# Patient Record
Sex: FEMALE | Race: OTHER | HISPANIC OR LATINO | URBAN - METROPOLITAN AREA
[De-identification: names, ages, dates, MRNs, and addresses within clinical notes are randomized per-mention and may not be internally consistent; named-entity substitution may affect disease eponyms.]

---

## 2018-02-07 ENCOUNTER — INPATIENT (INPATIENT)
Facility: HOSPITAL | Age: 23
LOS: 0 days | Discharge: HOME | End: 2018-02-08
Attending: OBSTETRICS & GYNECOLOGY | Admitting: OBSTETRICS & GYNECOLOGY
Payer: MEDICAID

## 2018-02-07 ENCOUNTER — RESULT REVIEW (OUTPATIENT)
Age: 23
End: 2018-02-07

## 2018-02-07 VITALS
RESPIRATION RATE: 17 BRPM | HEART RATE: 118 BPM | DIASTOLIC BLOOD PRESSURE: 86 MMHG | SYSTOLIC BLOOD PRESSURE: 118 MMHG | TEMPERATURE: 99 F | OXYGEN SATURATION: 100 %

## 2018-02-07 NOTE — ED ADULT TRIAGE NOTE - CHIEF COMPLAINT QUOTE
lmp 1/17, went to the clinic last week and was told via urine test that shes pregnant. no u/s preformed. pt c/o severe L pelvic pain sudden onset x 1 hr. pt writhing in pain in triage.

## 2018-02-08 ENCOUNTER — TRANSCRIPTION ENCOUNTER (OUTPATIENT)
Age: 23
End: 2018-02-08

## 2018-02-08 VITALS
DIASTOLIC BLOOD PRESSURE: 77 MMHG | RESPIRATION RATE: 15 BRPM | HEART RATE: 76 BPM | OXYGEN SATURATION: 99 % | SYSTOLIC BLOOD PRESSURE: 123 MMHG

## 2018-02-08 DIAGNOSIS — O00.90 UNSPECIFIED ECTOPIC PREGNANCY WITHOUT INTRAUTERINE PREGNANCY: ICD-10-CM

## 2018-02-08 PROBLEM — Z00.00 ENCOUNTER FOR PREVENTIVE HEALTH EXAMINATION: Status: ACTIVE | Noted: 2018-02-08

## 2018-02-08 LAB
ALBUMIN SERPL ELPH-MCNC: 4.4 G/DL — SIGNIFICANT CHANGE UP (ref 3.3–5)
ALP SERPL-CCNC: 90 U/L — SIGNIFICANT CHANGE UP (ref 40–120)
ALT FLD-CCNC: 13 U/L — SIGNIFICANT CHANGE UP (ref 4–33)
APPEARANCE UR: CLEAR — SIGNIFICANT CHANGE UP
AST SERPL-CCNC: 28 U/L — SIGNIFICANT CHANGE UP (ref 4–32)
BASOPHILS # BLD AUTO: 0.04 K/UL — SIGNIFICANT CHANGE UP (ref 0–0.2)
BASOPHILS NFR BLD AUTO: 0.4 % — SIGNIFICANT CHANGE UP (ref 0–2)
BILIRUB SERPL-MCNC: 0.3 MG/DL — SIGNIFICANT CHANGE UP (ref 0.2–1.2)
BILIRUB UR-MCNC: NEGATIVE — SIGNIFICANT CHANGE UP
BLD GP AB SCN SERPL QL: NEGATIVE — SIGNIFICANT CHANGE UP
BLOOD UR QL VISUAL: HIGH
BUN SERPL-MCNC: 10 MG/DL — SIGNIFICANT CHANGE UP (ref 7–23)
CALCIUM SERPL-MCNC: 9.2 MG/DL — SIGNIFICANT CHANGE UP (ref 8.4–10.5)
CHLORIDE SERPL-SCNC: 103 MMOL/L — SIGNIFICANT CHANGE UP (ref 98–107)
CO2 SERPL-SCNC: 23 MMOL/L — SIGNIFICANT CHANGE UP (ref 22–31)
COLOR SPEC: YELLOW — SIGNIFICANT CHANGE UP
CREAT SERPL-MCNC: 0.68 MG/DL — SIGNIFICANT CHANGE UP (ref 0.5–1.3)
EOSINOPHIL # BLD AUTO: 0.14 K/UL — SIGNIFICANT CHANGE UP (ref 0–0.5)
EOSINOPHIL NFR BLD AUTO: 1.3 % — SIGNIFICANT CHANGE UP (ref 0–6)
GLUCOSE SERPL-MCNC: 150 MG/DL — HIGH (ref 70–99)
GLUCOSE UR-MCNC: NEGATIVE — SIGNIFICANT CHANGE UP
HCG SERPL-ACNC: 1431 MIU/ML — SIGNIFICANT CHANGE UP
HCT VFR BLD CALC: 35.9 % — SIGNIFICANT CHANGE UP (ref 34.5–45)
HGB BLD-MCNC: 11.8 G/DL — SIGNIFICANT CHANGE UP (ref 11.5–15.5)
HYALINE CASTS # UR AUTO: SIGNIFICANT CHANGE UP (ref 0–?)
IMM GRANULOCYTES # BLD AUTO: 0.02 # — SIGNIFICANT CHANGE UP
IMM GRANULOCYTES NFR BLD AUTO: 0.2 % — SIGNIFICANT CHANGE UP (ref 0–1.5)
KETONES UR-MCNC: SIGNIFICANT CHANGE UP
LEUKOCYTE ESTERASE UR-ACNC: SIGNIFICANT CHANGE UP
LYMPHOCYTES # BLD AUTO: 1.73 K/UL — SIGNIFICANT CHANGE UP (ref 1–3.3)
LYMPHOCYTES # BLD AUTO: 16.1 % — SIGNIFICANT CHANGE UP (ref 13–44)
MCHC RBC-ENTMCNC: 26.9 PG — LOW (ref 27–34)
MCHC RBC-ENTMCNC: 32.9 % — SIGNIFICANT CHANGE UP (ref 32–36)
MCV RBC AUTO: 81.8 FL — SIGNIFICANT CHANGE UP (ref 80–100)
MONOCYTES # BLD AUTO: 0.58 K/UL — SIGNIFICANT CHANGE UP (ref 0–0.9)
MONOCYTES NFR BLD AUTO: 5.4 % — SIGNIFICANT CHANGE UP (ref 2–14)
MUCOUS THREADS # UR AUTO: SIGNIFICANT CHANGE UP
NEUTROPHILS # BLD AUTO: 8.26 K/UL — HIGH (ref 1.8–7.4)
NEUTROPHILS NFR BLD AUTO: 76.6 % — SIGNIFICANT CHANGE UP (ref 43–77)
NITRITE UR-MCNC: NEGATIVE — SIGNIFICANT CHANGE UP
NRBC # FLD: 0 — SIGNIFICANT CHANGE UP
PH UR: 7 — SIGNIFICANT CHANGE UP (ref 4.6–8)
PLATELET # BLD AUTO: 270 K/UL — SIGNIFICANT CHANGE UP (ref 150–400)
PMV BLD: 10.5 FL — SIGNIFICANT CHANGE UP (ref 7–13)
POTASSIUM SERPL-MCNC: 4 MMOL/L — SIGNIFICANT CHANGE UP (ref 3.5–5.3)
POTASSIUM SERPL-SCNC: 4 MMOL/L — SIGNIFICANT CHANGE UP (ref 3.5–5.3)
PROT SERPL-MCNC: 7.2 G/DL — SIGNIFICANT CHANGE UP (ref 6–8.3)
PROT UR-MCNC: 30 MG/DL — HIGH
RBC # BLD: 4.39 M/UL — SIGNIFICANT CHANGE UP (ref 3.8–5.2)
RBC # FLD: 14 % — SIGNIFICANT CHANGE UP (ref 10.3–14.5)
RBC CASTS # UR COMP ASSIST: >50 — HIGH (ref 0–?)
RH IG SCN BLD-IMP: POSITIVE — SIGNIFICANT CHANGE UP
RH IG SCN BLD-IMP: POSITIVE — SIGNIFICANT CHANGE UP
SODIUM SERPL-SCNC: 144 MMOL/L — SIGNIFICANT CHANGE UP (ref 135–145)
SP GR SPEC: 1.03 — SIGNIFICANT CHANGE UP (ref 1–1.04)
SQUAMOUS # UR AUTO: SIGNIFICANT CHANGE UP
UROBILINOGEN FLD QL: 2 MG/DL — HIGH
WBC # BLD: 10.77 K/UL — HIGH (ref 3.8–10.5)
WBC # FLD AUTO: 10.77 K/UL — HIGH (ref 3.8–10.5)
WBC UR QL: HIGH (ref 0–?)

## 2018-02-08 PROCEDURE — 76817 TRANSVAGINAL US OBSTETRIC: CPT

## 2018-02-08 PROCEDURE — 88305 TISSUE EXAM BY PATHOLOGIST: CPT | Mod: 26

## 2018-02-08 PROCEDURE — 76830 TRANSVAGINAL US NON-OB: CPT | Mod: 26

## 2018-02-08 RX ORDER — SODIUM CHLORIDE 9 MG/ML
1000 INJECTION INTRAMUSCULAR; INTRAVENOUS; SUBCUTANEOUS ONCE
Qty: 0 | Refills: 0 | Status: COMPLETED | OUTPATIENT
Start: 2018-02-08 | End: 2018-02-08

## 2018-02-08 RX ORDER — MORPHINE SULFATE 50 MG/1
4 CAPSULE, EXTENDED RELEASE ORAL ONCE
Qty: 0 | Refills: 0 | Status: DISCONTINUED | OUTPATIENT
Start: 2018-02-08 | End: 2018-02-08

## 2018-02-08 RX ORDER — ACETAMINOPHEN 500 MG
650 TABLET ORAL EVERY 6 HOURS
Qty: 0 | Refills: 0 | Status: DISCONTINUED | OUTPATIENT
Start: 2018-02-08 | End: 2018-02-08

## 2018-02-08 RX ORDER — OXYCODONE HYDROCHLORIDE 5 MG/1
1 TABLET ORAL
Qty: 10 | Refills: 0 | OUTPATIENT
Start: 2018-02-08

## 2018-02-08 RX ORDER — FENTANYL CITRATE 50 UG/ML
50 INJECTION INTRAVENOUS
Qty: 0 | Refills: 0 | Status: DISCONTINUED | OUTPATIENT
Start: 2018-02-08 | End: 2018-02-08

## 2018-02-08 RX ORDER — OXYCODONE HYDROCHLORIDE 5 MG/1
5 TABLET ORAL EVERY 4 HOURS
Qty: 0 | Refills: 0 | Status: DISCONTINUED | OUTPATIENT
Start: 2018-02-08 | End: 2018-02-08

## 2018-02-08 RX ORDER — ACETAMINOPHEN 500 MG
2 TABLET ORAL
Qty: 0 | Refills: 0 | COMMUNITY
Start: 2018-02-08

## 2018-02-08 RX ORDER — ONDANSETRON 8 MG/1
4 TABLET, FILM COATED ORAL ONCE
Qty: 0 | Refills: 0 | Status: DISCONTINUED | OUTPATIENT
Start: 2018-02-08 | End: 2018-02-08

## 2018-02-08 RX ORDER — FENTANYL CITRATE 50 UG/ML
25 INJECTION INTRAVENOUS
Qty: 0 | Refills: 0 | Status: DISCONTINUED | OUTPATIENT
Start: 2018-02-08 | End: 2018-02-08

## 2018-02-08 RX ORDER — SODIUM CHLORIDE 9 MG/ML
1000 INJECTION, SOLUTION INTRAVENOUS
Qty: 0 | Refills: 0 | Status: DISCONTINUED | OUTPATIENT
Start: 2018-02-08 | End: 2018-02-08

## 2018-02-08 RX ADMIN — SODIUM CHLORIDE 2000 MILLILITER(S): 9 INJECTION INTRAMUSCULAR; INTRAVENOUS; SUBCUTANEOUS at 00:32

## 2018-02-08 RX ADMIN — MORPHINE SULFATE 4 MILLIGRAM(S): 50 CAPSULE, EXTENDED RELEASE ORAL at 00:24

## 2018-02-08 RX ADMIN — MORPHINE SULFATE 4 MILLIGRAM(S): 50 CAPSULE, EXTENDED RELEASE ORAL at 02:37

## 2018-02-08 RX ADMIN — SODIUM CHLORIDE 125 MILLILITER(S): 9 INJECTION, SOLUTION INTRAVENOUS at 05:25

## 2018-02-08 NOTE — BRIEF OPERATIVE NOTE - OPERATION/FINDINGS
grossly dilated left fallopian tube with clots at fimbriated end, c/w ruptured ectopic pregnancy. 50cc hemoperitoneum evacuated  Grossly normal uterus, right fallopian tube, and bilateral ovaries

## 2018-02-08 NOTE — ASU DISCHARGE PLAN (ADULT/PEDIATRIC). - ACTIVITY LEVEL
weight bearing as tolerated/no douching/no intercourse/for 2 wks/nothing per vagina/no tub baths/no tampons

## 2018-02-08 NOTE — ASU DISCHARGE PLAN (ADULT/PEDIATRIC). - SPECIAL INSTRUCTIONS
Please call [phone] to schedule postoperative appointment at GYN clinic within 2 weeks  Avoid inserting anything into vagina for 2 weeks (tampons, sex)  You may shower normally, avoid tub baths  Light vaginal bleeding is normal. Seek medical evaluation if you have heavy bleeding soaking 1 pad per hour

## 2018-02-08 NOTE — BRIEF OPERATIVE NOTE - PROCEDURE
<<-----Click on this checkbox to enter Procedure Laparoscopic salpingectomy, for ectopic pregnancy  02/08/2018    Active  [id number]

## 2018-02-08 NOTE — ASU DISCHARGE PLAN (ADULT/PEDIATRIC). - NOTIFY
GYN Fever>100.4/Excessive Diarrhea/Increased Irritability or Sluggishness/Pain not relieved by Medications/Inability to Tolerate Liquids or Foods/Numbness, color, or temperature change to extremity/Bleeding that does not stop

## 2018-02-08 NOTE — ED PROVIDER NOTE - PHYSICAL EXAMINATION
Gen: AOx3, writhing in pain on stretcher, holding her left lower abdomen.    Head: NCAT  HEENT: PERRL, oral mucosa moist, normal conjunctiva  Lung: CTAB, no respiratory distress  CV: rrr, no murmurs, Normal perfusion  Abd: soft, nondistended, TTP LLQ, no CVA tenderness  MSK: No edema, no visible deformities  Skin: No rash   - Kelsey Bass, DO

## 2018-02-08 NOTE — ED PROVIDER NOTE - ATTENDING CONTRIBUTION TO CARE
22F PMh asthma p/w abd pain.  LMP 12/15 then  ( 4mos ago), had well check up in Dec and wasn't pregnant, then well check up 1w ago w/ +preg. Today had sudden onset LLQ pain, minimal vaginal bleeding. Tachycardic, other vitals wnl. Exam as above.  ddx: ectopic vs. torsion vs. less likely other abd pathology  CBC, cmp, T and S, hcg. UA/cx. TVUS. IVF, symptom control, reassess.

## 2018-02-08 NOTE — CONSULT NOTE ADULT - SUBJECTIVE AND OBJECTIVE BOX
R2 GYN Consult Note    22y G_P_ LMP      presents with       OBHx:  GynHx:   PMH:  PSH:  All:  Meds:   SocialHx:     Vital Signs Last 24 Hrs  T(C): 36.7 (08 Feb 2018 00:24), Max: 37.1 (07 Feb 2018 23:09)  T(F): 98 (08 Feb 2018 00:24), Max: 98.7 (07 Feb 2018 23:09)  HR: 87 (08 Feb 2018 00:24) (87 - 118)  BP: 144/86 (08 Feb 2018 00:24) (118/86 - 144/86)  RR: 16 (08 Feb 2018 00:24) (16 - 17)  SpO2: 100% (08 Feb 2018 00:24) (100% - 100%)    PE:  Gen: Comfortable, NAD  CV: RRR  Pulm: CTAB  Abd: Soft, NT  Ext: No edema or tenderness bilaterally  Spec Exam:    LABS:                        11.8   10.77 )-----------( 270      ( 08 Feb 2018 00:10 )             35.9                 RADIOLOGY & ADDITIONAL STUDIES:

## 2018-02-08 NOTE — H&P ADULT - ASSESSMENT
23yo  LMP 18 with abdominal pain with bHCG 1431 found to have likely ruptured ectopic pregnancy    Patient is hemodynamically stable 21yo  LMP 18 with abdominal pain with bHCG 1431 found to have ruptured ectopic pregnancy    Patient is hemodynamically stable without clinical signs of anemia at this time. In setting of abdominal pain and imaging findings c/w ruptured ectopic pregnancy with hemoperitoneum, recommend surgical management. Reviewed indications with patient and mother. Discussed risks, benefits of surgery including but not limited to infection, bleeding, injury to surrounding organs. Patient verbalized understanding and signed informed consent for diagnostic laparoscopy, left salpingectomy, removal of ectopic pregnancy, possible ex-lap

## 2018-02-08 NOTE — ED PROVIDER NOTE - PROGRESS NOTE DETAILS
Klepfish: Pain and HR improved. updated pt regarding US results. OB at bedside, will reassess. Pasqualefish: d/w OB, pt to go to OR.

## 2018-02-08 NOTE — ED PROVIDER NOTE - OBJECTIVE STATEMENT
22F pmh asthma p/w severe left pelvic pain and vaginal bleeding since about 1 hour ago.  Pt had some crampy pains intermittently over past few days, but pain became severe just recently.  Pt LMP 18.  Pt had routine physical last month and was told she was pregnant by urine pregnancy test at doctors.  No ultrasound has been performed yet.  Pt had healthy baby  4 months ago.  Denies chest pain, shortness of breath, nausea/vomiting/diarrhea, dizziness.  Pt took 2 midol and 2 extra strength tylenol at onset of severe pain.    - Kelsey Bass DO 22F pmh asthma p/w severe left pelvic pain and vaginal bleeding since about 1 hour ago.  Pt had some crampy pains intermittently over past few days, but pain became severe just recently.  Pt LMP 18.  Pt had routine physical last month and was told she was pregnant by urine pregnancy test at doctors.  No ultrasound has been performed yet.  Pt had healthy baby  4 months ago.  Denies chest pain, shortness of breath, nausea/vomiting/diarrhea, dizziness.  Pt took 2 midol and 2 extra strength tylenol at onset of severe pain.    - DO Pasquale Hoffmannfish: 22F PMh asthma p/w abd pain.  LMP 12/15 then  ( 4mos ago), had well check up in Dec and wasn't pregnant, then well check up 1w ago w/ +preg. Today had sudden onset LLQ pain, constant, radiating to back, not similar to prior. Also minimal vaginal bleeding that began while in ED. Denies f/c, SOB/CP, NVD, urinary complaints.

## 2018-02-08 NOTE — H&P ADULT - PROBLEM SELECTOR PLAN 1
- to OR for case as above  - OR team informed  - pt to remain NPO    Pt seen with Dr. Juice Celis, PGY2

## 2018-02-08 NOTE — ASU DISCHARGE PLAN (ADULT/PEDIATRIC). - MEDICATION SUMMARY - MEDICATIONS TO TAKE
I will START or STAY ON the medications listed below when I get home from the hospital:    acetaminophen 325 mg oral tablet  -- 2 tab(s) by mouth every 6 hours, As needed, Mild Pain (1 - 3)  -- Indication: For postop pain    oxyCODONE 5 mg oral tablet  -- 1 tab(s) by mouth every 4 hours, As needed, Moderate Pain (4 - 6) MDD:6  -- Indication: For postop pain

## 2018-02-08 NOTE — H&P ADULT - HISTORY OF PRESENT ILLNESS
HPI:    21yo  LMP 18 4 months postpartum s/p  presenting to ED with worsening LLQ pain and vaginal bleeding x 1 day. Pt reports intermittent cramps over the past few days that resolved with Midol; however, around 6 pm today, patient reports significant sharp crampy pain in LLQ 10/10 not relieved with Midol. Pt denies nausea, vomiting, fever, chills, lightheadedness or dizziness.  This is not a desired pregnancy. States that she was having intercourse once a month hoping to not get pregnant.  GYN in [place]  H/o Chlamydia treated 1 yr ago    OBHx: SVDx1, SAB x1  GynHx: reg menses, no known f/c.   PMH: mild intermittent asthma  PSH: s/p D&C x1  All: PCN - anaphylaxis  SH: nonsmoker, no alcohol or other drugs    Vital Signs Last 24 Hrs  T(C): 36.7 (2018 00:24), Max: 37.1 (2018 23:09)  T(F): 98 (2018 00:24), Max: 98.7 (2018 23:09)  HR: 87 (2018 00:24) (87 - 118)  BP: 144/86 (2018 00:24) (118/86 - 144/86)  RR: 16 (2018 00:24) (16 - 17)  SpO2: 100% (2018 00:24) (100% - 100%)    PE:  Gen: Comfortable, NAD  CV: RRR, no murmur  Pulm: CTAB  Abd: soft, mild TTP in LLQ, no rebound or guarding  Ext: No edema or tenderness bilaterally  Spec Exam: bloody mucus at cervix, no active bleeding  Bimanual: anteverted uterus nontender, no CMT, +R adnexal tenderness    LABS:                        11.8   10.77 )-----------( 270      ( 2018 00:10 )             35.9     02-    144  |  103  |  10  ----------------------------<  150<H>  4.0   |  23  |  0.68    Ca    9.2      2018 00:10    TPro  7.2  /  Alb  4.4  /  TBili  0.3  /  DBili  x   /  AST  28  /  ALT  13  /  AlkPhos  90  02-08      Urinalysis Basic - ( 2018 00:48 )    Color: YELLOW / Appearance: CLEAR / S.030 / pH: 7.0  Gluc: NEGATIVE / Ketone: TRACE  / Bili: NEGATIVE / Urobili: 2 mg/dL   Blood: LARGE / Protein: 30 mg/dL / Nitrite: NEGATIVE   Leuk Esterase: TRACE / RBC: >50 / WBC 5-10   Sq Epi: FEW / Non Sq Epi: x / Bacteria: x        RADIOLOGY & ADDITIONAL STUDIES:

## 2018-02-08 NOTE — ED ADULT NURSE NOTE - OBJECTIVE STATEMENT
Pt 4 months s/p vaginal delivery received to rm 15 aaox4 ambulatory c/o 10/10 abdominal pain worse on left side.  Pt reports recent visit to pmd dx as pregnant via UCG, No US performed.  Pt's mother at bedside reports finding Pt one hour ago on the bathroom floor in pain with vaginal bleeding.  Pt denies NVD feeling weak light headed NO numbness or tingling.  20 g IV access obtained at L.AC labs and meds as ordered.  WIll monitor.

## 2018-02-09 LAB
BACTERIA UR CULT: SIGNIFICANT CHANGE UP
SPECIMEN SOURCE: SIGNIFICANT CHANGE UP

## 2018-02-22 ENCOUNTER — APPOINTMENT (OUTPATIENT)
Dept: OBGYN | Facility: HOSPITAL | Age: 23
End: 2018-02-22

## 2023-09-12 ENCOUNTER — EMERGENCY (EMERGENCY)
Facility: HOSPITAL | Age: 28
LOS: 1 days | Discharge: HOME | End: 2023-09-12
Attending: STUDENT IN AN ORGANIZED HEALTH CARE EDUCATION/TRAINING PROGRAM | Admitting: STUDENT IN AN ORGANIZED HEALTH CARE EDUCATION/TRAINING PROGRAM
Payer: MEDICAID

## 2023-09-12 VITALS
HEART RATE: 71 BPM | SYSTOLIC BLOOD PRESSURE: 130 MMHG | RESPIRATION RATE: 18 BRPM | OXYGEN SATURATION: 100 % | TEMPERATURE: 98 F | DIASTOLIC BLOOD PRESSURE: 92 MMHG

## 2023-09-12 PROBLEM — J45.909 UNSPECIFIED ASTHMA, UNCOMPLICATED: Chronic | Status: ACTIVE | Noted: 2018-02-08

## 2023-09-12 PROCEDURE — 99283 EMERGENCY DEPT VISIT LOW MDM: CPT

## 2023-09-12 RX ORDER — IBUPROFEN 200 MG
1 TABLET ORAL
Qty: 10 | Refills: 0
Start: 2023-09-12 | End: 2023-09-16

## 2023-09-12 NOTE — ED PROVIDER NOTE - NSFOLLOWUPINSTRUCTIONS_ED_ALL_ED_FT
Dental Pain    Dental pain (toothache) may be caused by many things including tooth decay (cavities or caries), abscess or infection, or trauma. If you were prescribed an antibiotic medicine, finish all of it even if you start to feel better. Rinsing your mouth with salt water or applying ice to the painful area of your face may help with the pain. Follow up with a dentist is important in ensuring good oral health and preventing the worsening of dental disease.    SEEK IMMEDIATE MEDICAL CARE IF YOU HAVE ANY OF THE FOLLOWING SYMPTOMS: unable to open your mouth, trouble breathing or swallowing, fever, or swelling of the face, neck, or jaw.    [facility] D[facility]AL [facility] INFORMATION  [id number], [place] [address]  Phone: [phone]

## 2023-09-12 NOTE — ED PROVIDER NOTE - OBJECTIVE STATEMENT
27F, denies pmh, who presents with dental pain. Has been having ongoing dental pain x1 month. Has been told she needs extraction but followup is in 3 months. No fevers/chills. No facial swelling. No voice changes. Tolerating PO. No purulence. On ROS, denies headaches, fevers, chills, cough, sputum, cp, sob, abdominal pain, nvd, dysuria, hematuria, recent travel, trauma, syncope, black/bloody stools.

## 2023-09-12 NOTE — ED PROVIDER NOTE - PATIENT PORTAL LINK FT
You can access the FollowMyHealth Patient Portal offered by [facility] by registering at the following website: [email or web address]. By joining [facility]’s FollowMyHealth portal, you will also be able to view your health information using other applications (apps) compatible with our system.

## 2023-09-12 NOTE — ED PROVIDER NOTE - CLINICAL SUMMARY MEDICAL DECISION MAKING FREE TEXT BOX
Young female presenting with dental pain x1 month. Multiple dental caries on exam. No renaldo-apical abscess noted. Neck supple. Uvula midline. No voice changes. Tolerating PO. Will provide Rx for pain control and try to expedite dental followup.

## 2023-09-12 NOTE — ED PROVIDER NOTE - PHYSICAL EXAMINATION
Afebrile, well appearing, neck supple, no rash, rrr, ctabl, abdomen soft and ndnt, no le edema, stable gait.  Poor dentition, uvula midline, no renaldo-apical abscess noted  Multiple dental caries

## 2023-09-12 NOTE — ED ADULT TRIAGE NOTE - CHIEF COMPLAINT QUOTE
states" I am having tooth ache on left upper tooth since 3 weeks" I was seen by dentist and I was told that tooth need to be removed and I am unable to get an appointment and I am in Lot of pain.

## 2023-09-13 ENCOUNTER — APPOINTMENT (OUTPATIENT)
Age: 28
End: 2023-09-13

## 2023-10-25 NOTE — ED PROVIDER NOTE - SPECIALTY CARE
Anesthesia Pre Eval Note    Anesthesia ROS/Med Hx    Overall Review:  EKG was reviewed     Anesthetic Complication History:  Patient does not have a history of anesthetic complications      Pulmonary Review:  Patient does not have a pulmonary history      Neuro/Psych Review:  Patient does not have a neuro/psych history       Cardiovascular Review:  Patient does not have a cardiovascular history       GI/HEPATIC/RENAL Review:  Patient does not have a GI/hepatic/renalhistory       End/Other Review:  Patient does not have an endo/other history    Additional Results:     ALLERGIES:   -- Black Pepper   (Food Or Med) -- HIVES and SWELLING    --  Tongue and facial swelling.   -- Contrast Media -- HIVES   Last Labs        Component                Value               Date/Time                  WBC                      11.5 (H)            06/29/2023 0402            WBC                      7.6                 06/07/2023 1313            RBC                      3.11 (L)            06/29/2023 0402            RBC                      4.35                06/07/2023 1313            HGB                      8.2 (L)             06/29/2023 0402            HGB                      11.1 (L)            06/07/2023 1313            HCT                      26.2 (L)            06/29/2023 0402            HCT                      37.3 (L)            06/07/2023 1313            MCV                      84.2                06/29/2023 0402            MCV                      85.7                06/07/2023 1313            MCH                      26.4                06/29/2023 0402            MCH                      25.5 (L)            06/07/2023 1313            MCHC                     31.3 (L)            06/29/2023 0402            MCHC                     29.8 (L)            06/07/2023 1313            RDW-CV                   15.3 (H)            06/29/2023 0402            RDW-CV                   15.3 (H)            06/07/2023 1313             Sodium                   135                 06/27/2023 0354            Sodium                   139                 06/07/2023 1313            Potassium                4.6                 06/27/2023 0354            Potassium                4.4                 06/07/2023 1313            Chloride                 106                 06/27/2023 0354            Chloride                 104                 06/07/2023 1313            Carbon Dioxide           22                  06/27/2023 0354            Carbon Dioxide           26                  06/07/2023 1313            Glucose                  144 (H)             06/27/2023 0354            Glucose                  87                  06/07/2023 1313            BUN                      10                  06/27/2023 0354            BUN                      13                  06/07/2023 1313            Creatinine               0.56                06/27/2023 0354            Creatinine               0.57 (L)            06/07/2023 1313            Glomerular Filtrati*     >90                 06/27/2023 0354            GFR,ESTIMATE             >90                 06/07/2023 1313            CALCIUM                  9.1                 06/07/2023 1313            Calcium                  7.8 (L)             06/27/2023 0354            PLT                      280                 06/29/2023 0402            PLT                      413                 06/07/2023 1313            PTT                      43.2 (H)            05/07/2023 1313            INR                      1.0                 05/07/2023 1313        Past Medical History:  No date: Fibroids  No date: Gastroesophageal reflux disease  No date: Heavy periods  06/26/2023: History of blood transfusion      Comment:  no reaction  No date: Pelvic pain  No date: Sleep apnea      Comment:  no cpap  No date: Urinary frequency  Past Surgical History:  No date: Myomectomy      Comment:  abdominal myomectomy   Prior to  Admission medications :  Medication ferrous sulfate 325 (65 FE) MG tablet, Sig Take 1 tablet by mouth in the morning and 1 tablet in the evening., Start Date 6/29/23, End Date , Taking? Yes, Authorizing Provider Андрей Coburn MD    Medication docusate sodium (COLACE) 100 MG capsule, Sig Take 1 capsule by mouth in the morning and 1 capsule in the evening., Start Date 6/29/23, End Date , Taking? Yes, Authorizing Provider Андрей Coburn MD    Medication Fremanezumab-vfrm (Ajovy) 225 MG/1.5ML Solution Auto-injector, Sig Inject 1 Pen into the skin every 28 days., Start Date , End Date , Taking? Yes, Authorizing Provider Provider, Outside    Medication methocarbamol (ROBAXIN) 500 MG tablet, Sig Take 500 mg by mouth every 8 hours as needed for Muscle spasms., Start Date , End Date , Taking? Yes, Authorizing Provider Provider, Outside    Medication rimegepant sulfate (Nurtec) 75 MG disintegrating tablet, Sig Take 75 mg by mouth daily as needed for Migraine. Max 75 mg/24 hours., Start Date , End Date , Taking? Yes, Authorizing Provider Provider, Outside    Medication famotidine (PEPCID) 20 MG tablet, Sig Take 0.5 tablets by mouth in the morning and 0.5 tablets in the evening.  Patient taking differently: Take 10 mg by mouth in the morning and 10 mg in the evening. Takes as needed, Start Date 6/29/23, End Date , Taking? , Authorizing Provider Андрей Coburn MD            Relevant Problems   No relevant active problems       Physical Exam     Airway   Mallampati: II  TM Distance: >3 FB  Neck ROM: Full  Neck: Non-tender and Able to place in sniff position  TMJ Mobility: Good    Cardiovascular  Cardiovascular exam normal  Cardio Rhythm: Regular  Cardio Rate: Normal    Head Assessment  Head assessment: Normocephalic and Atraumatic    General Assessment  General Assessment: Alert and oriented and No acute distress    Dental Exam  Dental exam normal    Pulmonary Exam  Pulmonary exam normal  Breath sounds clear to auscultation:   Dentistry Yes    Abdominal Exam  Abdominal exam normal      Anesthesia Plan:    ASA Status: 2  Anesthesia Type: General    Induction: Intravenous  Preferred Airway Type: ETT  Maintenance: Inhalational    Post-op Pain Management: Per Surgeon      Checklist  Reviewed: NPO Status, Allergies, Medications, Problem list and Past Med History  Consent/Risks Discussed Statement:  The proposed anesthetic plan, including its risks and benefits, have been discussed with the Patient along with the risks and benefits of alternatives. Questions were encouraged and answered and the patient and/or representative understands and agrees to proceed.        I discussed with the patient (and/or patient's legal representative) the risks and benefits of the proposed anesthesia plan, General, which may include services performed by other anesthesia providers.    Alternative anesthesia plans, if available, were reviewed with the patient (and/or patient's legal representative). Discussion has been held with the patient (and/or patient's legal representative) regarding risks of anesthesia, which include Nausea, Vomiting and Dental Injury and emergent situations that may require change in anesthesia plan.    The patient (and/or patient's legal representative) has indicated understanding, his/her questions have been answered, and he/she wishes to proceed with the planned anesthetic.    Blood Products: Not Anticipated

## 2025-07-02 NOTE — ED PROVIDER NOTE - CPE EDP HEME LYMPH NORM
07/02/25        Jessica Hernandez  [address] [facility] [address]        Dear Jessica Hernandez      Our records show you are due for follow up blood work. As you know, follow up care is essential in maintaining your health. Please go to your [facility]; we will look out for your results.     Any questions, feel free to contact the office at [phone].    Sincerely,    Dr. Frances Durham  [facility]  [facility], [address]  [address] [facility] [address]  
- - -